# Patient Record
Sex: MALE | Race: WHITE | Employment: FULL TIME | URBAN - METROPOLITAN AREA
[De-identification: names, ages, dates, MRNs, and addresses within clinical notes are randomized per-mention and may not be internally consistent; named-entity substitution may affect disease eponyms.]

---

## 2024-06-10 ENCOUNTER — TELEPHONE (OUTPATIENT)
Dept: OPHTHALMOLOGY | Facility: CLINIC | Age: 59
End: 2024-06-10
Payer: COMMERCIAL

## 2024-06-13 ENCOUNTER — OFFICE VISIT (OUTPATIENT)
Dept: OPHTHALMOLOGY | Facility: CLINIC | Age: 59
End: 2024-06-13
Payer: COMMERCIAL

## 2024-06-13 DIAGNOSIS — H52.10 MYOPIA WITH REGULAR ASTIGMATISM AND PRESBYOPIA: Primary | ICD-10-CM

## 2024-06-13 DIAGNOSIS — H52.4 MYOPIA WITH REGULAR ASTIGMATISM AND PRESBYOPIA: Primary | ICD-10-CM

## 2024-06-13 DIAGNOSIS — H25.13 NUCLEAR AGE-RELATED CATARACT, BOTH EYES: ICD-10-CM

## 2024-06-13 DIAGNOSIS — H52.229 MYOPIA WITH REGULAR ASTIGMATISM AND PRESBYOPIA: Primary | ICD-10-CM

## 2024-06-13 PROCEDURE — 92004 COMPRE OPH EXAM NEW PT 1/>: CPT | Performed by: OPTOMETRIST

## 2024-06-13 PROCEDURE — 92015 DETERMINE REFRACTIVE STATE: CPT | Performed by: OPTOMETRIST

## 2024-06-13 RX ORDER — OXYBUTYNIN CHLORIDE 10 MG/1
TABLET, EXTENDED RELEASE ORAL
COMMUNITY
Start: 2024-06-02

## 2024-06-13 RX ORDER — POLYETHYLENE GLYCOL 3350, SODIUM CHLORIDE, SODIUM BICARBONATE, POTASSIUM CHLORIDE 420; 11.2; 5.72; 1.48 G/4L; G/4L; G/4L; G/4L
POWDER, FOR SOLUTION ORAL
COMMUNITY
Start: 2024-02-08

## 2024-06-13 RX ORDER — LOSARTAN POTASSIUM AND HYDROCHLOROTHIAZIDE 12.5; 5 MG/1; MG/1
TABLET ORAL
COMMUNITY
Start: 2024-06-12

## 2024-06-13 RX ORDER — ALBUTEROL SULFATE 90 UG/1
AEROSOL, METERED RESPIRATORY (INHALATION)
COMMUNITY
Start: 2024-04-09

## 2024-06-13 RX ORDER — RIVAROXABAN 20 MG/1
TABLET, FILM COATED ORAL
COMMUNITY
Start: 2024-05-16

## 2024-06-13 RX ORDER — METOPROLOL TARTRATE 25 MG/1
TABLET, FILM COATED ORAL
COMMUNITY
Start: 2024-05-16

## 2024-06-13 RX ORDER — SIMVASTATIN 20 MG
TABLET ORAL
COMMUNITY
Start: 2024-06-12

## 2024-06-13 ASSESSMENT — REFRACTION_WEARINGRX
OS_AXIS: 053
OS_CYLINDER: +0.75
OD_SPHERE: -2.25
SPECS_TYPE: PAL
OD_AXIS: 103
OD_ADD: +2.50
OS_SPHERE: -2.00
OD_CYLINDER: +0.25
OS_ADD: +2.50

## 2024-06-13 ASSESSMENT — REFRACTION_CURRENTRX
OD_BASECURVE: 8.6
OD_DIAMETER: 14.2
OS_BRAND: ULTRA PRESBYOPIA
OS_SPHERE: -1.50
OS_BASECURVE: 8.6
OD_BRAND: ULTRA PRESBYOPIA
OD_ADD: HIGH
OS_DIAMETER: 14.2
OS_ADD: HIGH
OD_SPHERE: -1.75

## 2024-06-13 ASSESSMENT — CONF VISUAL FIELD
OS_SUPERIOR_TEMPORAL_RESTRICTION: 0
OS_NORMAL: 1
OD_INFERIOR_TEMPORAL_RESTRICTION: 0
OD_SUPERIOR_NASAL_RESTRICTION: 0
OS_INFERIOR_TEMPORAL_RESTRICTION: 0
OD_SUPERIOR_TEMPORAL_RESTRICTION: 0
OS_INFERIOR_NASAL_RESTRICTION: 0
OD_NORMAL: 1
METHOD: COUNTING FINGERS
OS_SUPERIOR_NASAL_RESTRICTION: 0
OD_INFERIOR_NASAL_RESTRICTION: 0

## 2024-06-13 ASSESSMENT — REFRACTION_MANIFEST
OS_CYLINDER: +1.00
OS_AXIS: 060
OD_CYLINDER: +0.25
OS_ADD: +2.50
OD_AXIS: 160
OD_SPHERE: -2.00
OS_SPHERE: -2.00
OD_ADD: +2.50

## 2024-06-13 ASSESSMENT — VISUAL ACUITY
OD_CC+: -1
OS_CC: 20/20
METHOD: SNELLEN - LINEAR
OD_CC: J1+/-2
OD_CC: 20/20
OS_CC: J1+
CORRECTION_TYPE: GLASSES

## 2024-06-13 ASSESSMENT — TONOMETRY
OD_IOP_MMHG: 15
OS_IOP_MMHG: 16
IOP_METHOD: ICARE

## 2024-06-13 ASSESSMENT — CUP TO DISC RATIO
OS_RATIO: 0.35
OD_RATIO: 0.25

## 2024-06-13 NOTE — NURSING NOTE
Chief Complaints and History of Present Illnesses   Patient presents with    COMPREHENSIVE EYE EXAM     Comprehensive exam and interest in contacts as well.     Chief Complaint(s) and History of Present Illness(es)       COMPREHENSIVE EYE EXAM              Laterality: both eyes    Associated symptoms: Negative for dryness    Pain scale: 0/10    Comments: Comprehensive exam and interest in contacts as well.              Comments    Reports has never been able to wear contacts due to discomfort with one eye unsure which. It was the eye that use a toric lenses. Wife is getting contacts and questions if he might have better CL now too.   Seeing OK with glasses but they are several years old.     Would like to be glasses free if possible with contacts.     Gloria Jorgensen, COT COT 12:36 PM June 13, 2024

## 2024-06-13 NOTE — PROGRESS NOTES
A/P  1.) Myopia/Astigmatism/Presbyopia OU  -Corrects well with updated Rx today  -Previous CL wearer, has not worn in a while. Did not tolerate soft toric left eye. Did not tolerate monovision  -Decent start in MF CL's today (spherical equiv OU). He would prefer daily lens. Modified monovision right eye dominant    2.) Nuclear sclerosis OU  -Not visually significant  -Reviewed with pt, monitor only    Order CL trials and mail to pt. Okay to  order if working well, otherwise contact clinic prn.  Monitor 1-2 years comprehensive, sooner prn    I have confirmed the patient's CC, HPI and reviewed Past Medical History, Past Surgical History, Social History, Family History, Problem List, Medication List and agree with Tech note.     VELIA McbrideO BETINAS

## 2024-06-21 ENCOUNTER — DOCUMENTATION ONLY (OUTPATIENT)
Dept: OPTOMETRY | Facility: CLINIC | Age: 59
End: 2024-06-21

## 2024-07-28 ENCOUNTER — HEALTH MAINTENANCE LETTER (OUTPATIENT)
Age: 59
End: 2024-07-28

## 2025-08-10 ENCOUNTER — HEALTH MAINTENANCE LETTER (OUTPATIENT)
Age: 60
End: 2025-08-10